# Patient Record
Sex: MALE | Employment: OTHER | URBAN - METROPOLITAN AREA
[De-identification: names, ages, dates, MRNs, and addresses within clinical notes are randomized per-mention and may not be internally consistent; named-entity substitution may affect disease eponyms.]

---

## 2021-01-20 ENCOUNTER — HOSPITAL ENCOUNTER (OUTPATIENT)
Dept: PREADMISSION TESTING | Age: 61
Discharge: HOME | End: 2021-01-20
Payer: OTHER GOVERNMENT

## 2021-01-20 LAB
BASOPHILS # BLD: 0 K/UL (ref 0–0.1)
BASOPHILS NFR BLD: 1 % (ref 0–2)
DIFFERENTIAL METHOD BLD: ABNORMAL
EOSINOPHIL # BLD: 0.1 K/UL (ref 0–0.4)
EOSINOPHIL NFR BLD: 2 % (ref 0–5)
ERYTHROCYTE [DISTWIDTH] IN BLOOD BY AUTOMATED COUNT: 13.4 % (ref 11.6–14.5)
HCT VFR BLD AUTO: 47.8 % (ref 36–48)
HGB BLD-MCNC: 16.4 G/DL (ref 13–16)
LYMPHOCYTES # BLD: 2.1 K/UL (ref 0.9–3.6)
LYMPHOCYTES NFR BLD: 33 % (ref 21–52)
MCH RBC QN AUTO: 31.7 PG (ref 24–34)
MCHC RBC AUTO-ENTMCNC: 34.3 G/DL (ref 31–37)
MCV RBC AUTO: 92.3 FL (ref 74–97)
MONOCYTES # BLD: 0.7 K/UL (ref 0.05–1.2)
MONOCYTES NFR BLD: 11 % (ref 3–10)
NEUTS SEG # BLD: 3.4 K/UL (ref 1.8–8)
NEUTS SEG NFR BLD: 53 % (ref 40–73)
PLATELET # BLD AUTO: 291 K/UL (ref 135–420)
PMV BLD AUTO: 10 FL (ref 9.2–11.8)
RBC # BLD AUTO: 5.18 M/UL (ref 4.7–5.5)
WBC # BLD AUTO: 6.4 K/UL (ref 4.6–13.2)

## 2021-01-20 PROCEDURE — 85025 COMPLETE CBC W/AUTO DIFF WBC: CPT

## 2021-01-20 PROCEDURE — 36415 COLL VENOUS BLD VENIPUNCTURE: CPT

## 2021-02-03 ENCOUNTER — HOSPITAL ENCOUNTER (OUTPATIENT)
Dept: PREADMISSION TESTING | Age: 61
Discharge: HOME | End: 2021-02-03
Payer: OTHER GOVERNMENT

## 2021-02-03 PROCEDURE — U0003 INFECTIOUS AGENT DETECTION BY NUCLEIC ACID (DNA OR RNA); SEVERE ACUTE RESPIRATORY SYNDROME CORONAVIRUS 2 (SARS-COV-2) (CORONAVIRUS DISEASE [COVID-19]), AMPLIFIED PROBE TECHNIQUE, MAKING USE OF HIGH THROUGHPUT TECHNOLOGIES AS DESCRIBED BY CMS-2020-01-R: HCPCS

## 2021-02-04 LAB — SARS-COV-2, COV2NT: NOT DETECTED

## 2021-02-07 NOTE — H&P
12/30/20      Patient Name:   Jay Justice  Account #:  [de-identified]  YOB: 1960    Chief Complaint:  Bilateral shoulder pain, right more than left. History of Chief Complaint:  He had the MRI done of his right shoulder. The study was reviewed which shows a nearly full-thickness tear involving the supraspinatus with no sign of muscle atrophy. There is some degenerative change of the [facility] joint and signs of impingement. Past Medical/Surgical History:    Disease/Disorder Date Side Surgery Date Side Comment   Arthritis         Sleep apnea         Allergies:  No known allergies. Ingredient Reaction Medication Name Comment   NO KNOWN ALLERGIES        Current Medications:    Medication Directions   Percocet 5 mg-325 mg tablet take 1 tablet by oral route every 4 hours as needed   Social History:    SMOKING  Status Tobacco Type Units Per Day Yrs Used   Never smoker      ALCOHOL  There is no history of alcohol use. Family History:    Disease Detail Family Member Age Cause of Death Comments   Family history of Cancer   N    Family history of Diabetes   N    Review of Systems:    GENERAL:  Patient has no signs of chills. , fever or weight change  HEAD/ENTM:  Patient has no signs of headaches, dizziness, hearing loss, ringing in ears, sore throat/hoarseness, recent cold, double vision, blurred vision, itchy eyes, eye redness or eye discharge. NEUROLOGIC: Patient presents with numbness/tingling. Patient has no signs of fainting, muscle weakness, loss of balance or seizure disorder. CARDIOVASCULAR:  Patient has no signs of chest pain, palpitations, rheumatic fever or heart murmur. RESPIRATORY:  Patient has no signs of chronic cough, wheezing, difficulty breathing, pain on breathing or shortness of breath. GASTROINTESTINAL:  Patient has no signs of nausea/vomiting, difficulty swallowing, gas/bloating, indigestion, abdominal pain, diarrhea, bloody stools or hemorrhoids.   GENITOURINARY:  Patient has no signs of blood in urine, painful urinating, burning sensation, bladder/kidney infection, frequent urinating or incontinence. MUSCULOSKELETAL: Patient presents with tendonitis. Patient has no signs of fracture/dislocation, sprain/strain, joint stiffness, joint pain, rheumatoid disease, gout or swelling of feet. INTEGUMENTARY:  Patient has no signs of rash/itching, psoriasis, Raynaud's phenomenon or varicose veins. EMOTIONAL:  Patient has no signs of anxiety, depression, bipolar disorder, memory loss or change in mood. Vitals:  Date BP Pulse Temp (F) Resp. (per min.) Height (Total in.) Weight (lbs.) BMI   12/16/2020     67.00  30.85   Physical Examination:  His physical exam is unchanged in that the right shoulder shows full motion. He has excellent strength in all planes. He has pain with thumb-up abduction. He also has significant pain with rotation of the forearm with the shoulder abducted 70 degrees. He has some mild pain also with Speed's and [place]'s maneuvers. He has pain also with flexion to 90 degrees followed by internal rotation. Impression:  Right shoulder impingement, degenerative joint disease of the acromioclavicular joint, nearly full-thickness rotator cuff tear. Plan:  He will be scheduled for a right shoulder arthroscopic decompression, resection of the distal clavicle and rotator cuff repair. He understands the risks and benefits of the procedure and is ready to proceed. He was given a sling; postop he will get Percocet.

## 2021-02-09 ENCOUNTER — ANESTHESIA (OUTPATIENT)
Dept: SURGERY | Age: 61
End: 2021-02-09
Payer: OTHER GOVERNMENT

## 2021-02-09 ENCOUNTER — ANESTHESIA EVENT (OUTPATIENT)
Dept: SURGERY | Age: 61
End: 2021-02-09
Payer: OTHER GOVERNMENT

## 2021-02-09 ENCOUNTER — HOSPITAL ENCOUNTER (OUTPATIENT)
Age: 61
Setting detail: OUTPATIENT SURGERY
Discharge: HOME | End: 2021-02-09
Attending: ORTHOPAEDIC SURGERY | Admitting: ORTHOPAEDIC SURGERY
Payer: OTHER GOVERNMENT

## 2021-02-09 VITALS
TEMPERATURE: 96.3 F | SYSTOLIC BLOOD PRESSURE: 115 MMHG | HEART RATE: 80 BPM | BODY MASS INDEX: 32.83 KG/M2 | WEIGHT: 209.2 LBS | RESPIRATION RATE: 16 BRPM | DIASTOLIC BLOOD PRESSURE: 53 MMHG | HEIGHT: 67 IN | OXYGEN SATURATION: 97 %

## 2021-02-09 DIAGNOSIS — M75.41 ROTATOR CUFF IMPINGEMENT SYNDROME OF RIGHT SHOULDER: Primary | Chronic | ICD-10-CM

## 2021-02-09 PROCEDURE — 77030002960 HC SUT PASS S&N -C: Performed by: ORTHOPAEDIC SURGERY

## 2021-02-09 PROCEDURE — 74011250636 HC RX REV CODE- 250/636: Performed by: NURSE ANESTHETIST, CERTIFIED REGISTERED

## 2021-02-09 PROCEDURE — 93005 ELECTROCARDIOGRAM TRACING: CPT

## 2021-02-09 PROCEDURE — 76210000021 HC REC RM PH II 0.5 TO 1 HR: Performed by: ORTHOPAEDIC SURGERY

## 2021-02-09 PROCEDURE — 77030034478 HC TU IRR ARTHRO PT ARTH -B: Performed by: ORTHOPAEDIC SURGERY

## 2021-02-09 PROCEDURE — 64415 NJX AA&/STRD BRCH PLXS IMG: CPT | Performed by: ANESTHESIOLOGY

## 2021-02-09 PROCEDURE — 77030020782 HC GWN BAIR PAWS FLX 3M -B: Performed by: ORTHOPAEDIC SURGERY

## 2021-02-09 PROCEDURE — 74011000250 HC RX REV CODE- 250: Performed by: ANESTHESIOLOGY

## 2021-02-09 PROCEDURE — 77030006884 HC BLD SHV INCIS S&N -B: Performed by: ORTHOPAEDIC SURGERY

## 2021-02-09 PROCEDURE — 77030012711 HC WND ARTHRO ABLT S&N -D: Performed by: ORTHOPAEDIC SURGERY

## 2021-02-09 PROCEDURE — 77030040361 HC SLV COMPR DVT MDII -B: Performed by: ORTHOPAEDIC SURGERY

## 2021-02-09 PROCEDURE — 74011250636 HC RX REV CODE- 250/636: Performed by: ORTHOPAEDIC SURGERY

## 2021-02-09 PROCEDURE — 77030004451 HC BUR SHV S&N -B: Performed by: ORTHOPAEDIC SURGERY

## 2021-02-09 PROCEDURE — 2709999900 HC NON-CHARGEABLE SUPPLY: Performed by: ORTHOPAEDIC SURGERY

## 2021-02-09 PROCEDURE — C1713 ANCHOR/SCREW BN/BN,TIS/BN: HCPCS | Performed by: ORTHOPAEDIC SURGERY

## 2021-02-09 PROCEDURE — 74011250636 HC RX REV CODE- 250/636: Performed by: ANESTHESIOLOGY

## 2021-02-09 PROCEDURE — 74011000250 HC RX REV CODE- 250: Performed by: ORTHOPAEDIC SURGERY

## 2021-02-09 PROCEDURE — 76060000035 HC ANESTHESIA 2 TO 2.5 HR: Performed by: ORTHOPAEDIC SURGERY

## 2021-02-09 PROCEDURE — 76210000006 HC OR PH I REC 0.5 TO 1 HR: Performed by: ORTHOPAEDIC SURGERY

## 2021-02-09 PROCEDURE — 77030008477 HC STYL SATN SLP COVD -A: Performed by: ANESTHESIOLOGY

## 2021-02-09 PROCEDURE — 74011000250 HC RX REV CODE- 250: Performed by: NURSE ANESTHETIST, CERTIFIED REGISTERED

## 2021-02-09 PROCEDURE — 77030008683 HC TU ET CUF COVD -A: Performed by: ANESTHESIOLOGY

## 2021-02-09 PROCEDURE — 77030002916 HC SUT ETHLN J&J -A: Performed by: ORTHOPAEDIC SURGERY

## 2021-02-09 PROCEDURE — 76010000131 HC OR TIME 2 TO 2.5 HR: Performed by: ORTHOPAEDIC SURGERY

## 2021-02-09 PROCEDURE — 76942 ECHO GUIDE FOR BIOPSY: CPT | Performed by: ORTHOPAEDIC SURGERY

## 2021-02-09 PROCEDURE — 77030006643: Performed by: ANESTHESIOLOGY

## 2021-02-09 DEVICE — MULTIFIX S-ULTRA 5.5MM KNOTLESS ANCHOR
Type: IMPLANTABLE DEVICE | Site: SHOULDER | Status: FUNCTIONAL
Brand: MULTIFIX

## 2021-02-09 RX ORDER — BUPIVACAINE HYDROCHLORIDE AND EPINEPHRINE 5; 5 MG/ML; UG/ML
INJECTION, SOLUTION EPIDURAL; INTRACAUDAL; PERINEURAL AS NEEDED
Status: DISCONTINUED | OUTPATIENT
Start: 2021-02-09 | End: 2021-02-09 | Stop reason: HOSPADM

## 2021-02-09 RX ORDER — ROCURONIUM BROMIDE 10 MG/ML
INJECTION, SOLUTION INTRAVENOUS AS NEEDED
Status: DISCONTINUED | OUTPATIENT
Start: 2021-02-09 | End: 2021-02-09 | Stop reason: HOSPADM

## 2021-02-09 RX ORDER — CEFAZOLIN SODIUM/WATER 2 G/20 ML
2 SYRINGE (ML) INTRAVENOUS ONCE
Status: COMPLETED | OUTPATIENT
Start: 2021-02-09 | End: 2021-02-09

## 2021-02-09 RX ORDER — DEXAMETHASONE SODIUM PHOSPHATE 4 MG/ML
INJECTION, SOLUTION INTRA-ARTICULAR; INTRALESIONAL; INTRAMUSCULAR; INTRAVENOUS; SOFT TISSUE
Status: COMPLETED | OUTPATIENT
Start: 2021-02-09 | End: 2021-02-09

## 2021-02-09 RX ORDER — FLUMAZENIL 0.1 MG/ML
0.2 INJECTION INTRAVENOUS
Status: DISCONTINUED | OUTPATIENT
Start: 2021-02-09 | End: 2021-02-09 | Stop reason: HOSPADM

## 2021-02-09 RX ORDER — ONDANSETRON 2 MG/ML
4 INJECTION INTRAMUSCULAR; INTRAVENOUS ONCE
Status: DISCONTINUED | OUTPATIENT
Start: 2021-02-09 | End: 2021-02-09 | Stop reason: HOSPADM

## 2021-02-09 RX ORDER — DIPHENHYDRAMINE HYDROCHLORIDE 50 MG/ML
12.5 INJECTION, SOLUTION INTRAMUSCULAR; INTRAVENOUS
Status: DISCONTINUED | OUTPATIENT
Start: 2021-02-09 | End: 2021-02-09 | Stop reason: HOSPADM

## 2021-02-09 RX ORDER — PROPOFOL 10 MG/ML
INJECTION, EMULSION INTRAVENOUS AS NEEDED
Status: DISCONTINUED | OUTPATIENT
Start: 2021-02-09 | End: 2021-02-09 | Stop reason: HOSPADM

## 2021-02-09 RX ORDER — HYDROMORPHONE HYDROCHLORIDE 1 MG/ML
0.2 INJECTION, SOLUTION INTRAMUSCULAR; INTRAVENOUS; SUBCUTANEOUS AS NEEDED
Status: DISCONTINUED | OUTPATIENT
Start: 2021-02-09 | End: 2021-02-09 | Stop reason: HOSPADM

## 2021-02-09 RX ORDER — MIDAZOLAM HYDROCHLORIDE 1 MG/ML
INJECTION, SOLUTION INTRAMUSCULAR; INTRAVENOUS AS NEEDED
Status: DISCONTINUED | OUTPATIENT
Start: 2021-02-09 | End: 2021-02-09 | Stop reason: HOSPADM

## 2021-02-09 RX ORDER — LIDOCAINE HYDROCHLORIDE 20 MG/ML
INJECTION, SOLUTION EPIDURAL; INFILTRATION; INTRACAUDAL; PERINEURAL AS NEEDED
Status: DISCONTINUED | OUTPATIENT
Start: 2021-02-09 | End: 2021-02-09 | Stop reason: HOSPADM

## 2021-02-09 RX ORDER — ROPIVACAINE HYDROCHLORIDE 5 MG/ML
INJECTION, SOLUTION EPIDURAL; INFILTRATION; PERINEURAL
Status: COMPLETED | OUTPATIENT
Start: 2021-02-09 | End: 2021-02-09

## 2021-02-09 RX ORDER — ALBUTEROL SULFATE 0.83 MG/ML
2.5 SOLUTION RESPIRATORY (INHALATION)
Status: DISCONTINUED | OUTPATIENT
Start: 2021-02-09 | End: 2021-02-09 | Stop reason: HOSPADM

## 2021-02-09 RX ORDER — ONDANSETRON 2 MG/ML
INJECTION INTRAMUSCULAR; INTRAVENOUS AS NEEDED
Status: DISCONTINUED | OUTPATIENT
Start: 2021-02-09 | End: 2021-02-09 | Stop reason: HOSPADM

## 2021-02-09 RX ORDER — HYDROCODONE BITARTRATE AND ACETAMINOPHEN 5; 325 MG/1; MG/1
1 TABLET ORAL AS NEEDED
Status: DISCONTINUED | OUTPATIENT
Start: 2021-02-09 | End: 2021-02-09 | Stop reason: HOSPADM

## 2021-02-09 RX ORDER — DEXMEDETOMIDINE HYDROCHLORIDE 100 UG/ML
INJECTION, SOLUTION INTRAVENOUS
Status: COMPLETED | OUTPATIENT
Start: 2021-02-09 | End: 2021-02-09

## 2021-02-09 RX ORDER — HYDROMORPHONE HYDROCHLORIDE 1 MG/ML
0.5 INJECTION, SOLUTION INTRAMUSCULAR; INTRAVENOUS; SUBCUTANEOUS
Status: DISCONTINUED | OUTPATIENT
Start: 2021-02-09 | End: 2021-02-09 | Stop reason: HOSPADM

## 2021-02-09 RX ORDER — SODIUM CHLORIDE, SODIUM LACTATE, POTASSIUM CHLORIDE, CALCIUM CHLORIDE 600; 310; 30; 20 MG/100ML; MG/100ML; MG/100ML; MG/100ML
125 INJECTION, SOLUTION INTRAVENOUS CONTINUOUS
Status: DISCONTINUED | OUTPATIENT
Start: 2021-02-09 | End: 2021-02-09 | Stop reason: HOSPADM

## 2021-02-09 RX ORDER — SODIUM CHLORIDE, SODIUM LACTATE, POTASSIUM CHLORIDE, CALCIUM CHLORIDE 600; 310; 30; 20 MG/100ML; MG/100ML; MG/100ML; MG/100ML
25 INJECTION, SOLUTION INTRAVENOUS CONTINUOUS
Status: DISCONTINUED | OUTPATIENT
Start: 2021-02-09 | End: 2021-02-09 | Stop reason: HOSPADM

## 2021-02-09 RX ORDER — GLYCOPYRROLATE 0.2 MG/ML
INJECTION INTRAMUSCULAR; INTRAVENOUS AS NEEDED
Status: DISCONTINUED | OUTPATIENT
Start: 2021-02-09 | End: 2021-02-09 | Stop reason: HOSPADM

## 2021-02-09 RX ORDER — FENTANYL CITRATE 50 UG/ML
INJECTION, SOLUTION INTRAMUSCULAR; INTRAVENOUS AS NEEDED
Status: DISCONTINUED | OUTPATIENT
Start: 2021-02-09 | End: 2021-02-09 | Stop reason: HOSPADM

## 2021-02-09 RX ORDER — NEOSTIGMINE METHYLSULFATE 1 MG/ML
INJECTION, SOLUTION INTRAVENOUS AS NEEDED
Status: DISCONTINUED | OUTPATIENT
Start: 2021-02-09 | End: 2021-02-09 | Stop reason: HOSPADM

## 2021-02-09 RX ORDER — EPHEDRINE SULFATE/0.9% NACL/PF 50 MG/5 ML
SYRINGE (ML) INTRAVENOUS AS NEEDED
Status: DISCONTINUED | OUTPATIENT
Start: 2021-02-09 | End: 2021-02-09 | Stop reason: HOSPADM

## 2021-02-09 RX ORDER — OXYCODONE AND ACETAMINOPHEN 5; 325 MG/1; MG/1
1 TABLET ORAL
Qty: 60 TAB | Refills: 0 | Status: SHIPPED
Start: 2021-02-09 | End: 2021-02-16

## 2021-02-09 RX ADMIN — Medication 10 MG: at 11:08

## 2021-02-09 RX ADMIN — Medication 2 G: at 09:32

## 2021-02-09 RX ADMIN — ROCURONIUM BROMIDE 40 MG: 10 INJECTION, SOLUTION INTRAVENOUS at 09:40

## 2021-02-09 RX ADMIN — Medication 3 MG: at 11:23

## 2021-02-09 RX ADMIN — GLYCOPYRROLATE 0.4 MG: 0.2 INJECTION INTRAMUSCULAR; INTRAVENOUS at 11:23

## 2021-02-09 RX ADMIN — Medication 10 MG: at 09:58

## 2021-02-09 RX ADMIN — SODIUM CHLORIDE, SODIUM LACTATE, POTASSIUM CHLORIDE, AND CALCIUM CHLORIDE 125 ML/HR: 600; 310; 30; 20 INJECTION, SOLUTION INTRAVENOUS at 07:31

## 2021-02-09 RX ADMIN — DEXAMETHASONE SODIUM PHOSPHATE 4 MG: 4 INJECTION, SOLUTION INTRAMUSCULAR; INTRAVENOUS at 08:39

## 2021-02-09 RX ADMIN — Medication 10 MG: at 10:13

## 2021-02-09 RX ADMIN — MIDAZOLAM 2 MG: 1 INJECTION INTRAMUSCULAR; INTRAVENOUS at 08:27

## 2021-02-09 RX ADMIN — Medication 10 MG: at 10:23

## 2021-02-09 RX ADMIN — ONDANSETRON HYDROCHLORIDE 4 MG: 2 INJECTION INTRAMUSCULAR; INTRAVENOUS at 09:35

## 2021-02-09 RX ADMIN — PROPOFOL 180 MG: 10 INJECTION, EMULSION INTRAVENOUS at 09:39

## 2021-02-09 RX ADMIN — FENTANYL CITRATE 50 MCG: 50 INJECTION, SOLUTION INTRAMUSCULAR; INTRAVENOUS at 08:29

## 2021-02-09 RX ADMIN — SODIUM CHLORIDE, SODIUM LACTATE, POTASSIUM CHLORIDE, AND CALCIUM CHLORIDE: 600; 310; 30; 20 INJECTION, SOLUTION INTRAVENOUS at 10:14

## 2021-02-09 RX ADMIN — Medication 10 MG: at 10:43

## 2021-02-09 RX ADMIN — SODIUM CHLORIDE, SODIUM LACTATE, POTASSIUM CHLORIDE, AND CALCIUM CHLORIDE 125 ML/HR: 600; 310; 30; 20 INJECTION, SOLUTION INTRAVENOUS at 12:05

## 2021-02-09 RX ADMIN — GLYCOPYRROLATE 0.4 MG: 0.2 INJECTION INTRAMUSCULAR; INTRAVENOUS at 09:35

## 2021-02-09 RX ADMIN — LIDOCAINE HYDROCHLORIDE 80 MG: 20 INJECTION, SOLUTION EPIDURAL; INFILTRATION; INTRACAUDAL; PERINEURAL at 09:38

## 2021-02-09 RX ADMIN — DEXMEDETOMIDINE HYDROCHLORIDE 20 MCG: 100 INJECTION, SOLUTION INTRAVENOUS at 08:39

## 2021-02-09 RX ADMIN — ROPIVACAINE HYDROCHLORIDE 20 ML: 5 INJECTION, SOLUTION EPIDURAL; INFILTRATION; PERINEURAL at 08:39

## 2021-02-09 RX ADMIN — FENTANYL CITRATE 50 MCG: 50 INJECTION, SOLUTION INTRAMUSCULAR; INTRAVENOUS at 08:27

## 2021-02-09 NOTE — ANESTHESIA POSTPROCEDURE EVALUATION
Post-Anesthesia Evaluation & Assessment    Visit Vitals  /70   Pulse 76   Temp 36.2 °C (97.2 °F)   Resp 14   Ht 5' 7\" (1.702 m)   Wt 94.9 kg (209 lb 3.2 oz)   SpO2 96%   BMI 32.77 kg/m²       Nausea/Vomiting: no nausea and no vomiting    Post-operative hydration adequate. Pain score (VAS): 0    Mental status & Level of consciousness: alert and oriented x 3    Neurological status: moves all extremities, sensation grossly intact    Pulmonary status: airway patent, no supplemental oxygen required    Complications related to anesthesia: none    Patient has met all discharge requirements. Additional comments:  Procedure(s):  RIGHT SHOULDER ARTHROSCOPIC DECOMPRESSION, RESECTION DISTAL CLAVICLE ROTATOR CUFF REPAIR GEN WITH SCALENE BLOCK PRN. general    <BSHSIANPOST>    INITIAL Post-op Vital signs:   Vitals Value Taken Time   /91 02/09/21 1222   Temp 36.2 °C (97.2 °F) 02/09/21 1215   Pulse 76 02/09/21 1225   Resp 14 02/09/21 1225   SpO2 96 % 02/09/21 1225   Vitals shown include unvalidated device data.

## 2021-02-09 NOTE — PERIOP NOTES
Report given to Valentin Triana RN for phase 2 level of care.     Visit Vitals  /70   Pulse 76   Temp 97.2 °F (36.2 °C)   Resp 14   Ht 5' 7\" (1.702 m)   Wt 94.9 kg (209 lb 3.2 oz)   SpO2 96%   BMI 32.77 kg/m²       Intake/Output Summary (Last 24 hours) at 2/9/2021 1233  Last data filed at 2/9/2021 1230  Gross per 24 hour   Intake 2300 ml   Output 350 ml   Net 1950 ml

## 2021-02-09 NOTE — DISCHARGE INSTRUCTIONS
Patient Education        9 Things To Do If You've Been Exposed to COVID-19    Stay home. If you've been exposed, you should stay in quarantine for at least 14 days. Ask your doctor when it's safe to end your quarantine. Don't go to school, work, or public areas. And don't use public transportation, ride-shares, or taxis unless you have no choice. Leave your home only if you need to get medical care. But call the doctor's office first so they know you're coming, and wear a cloth face cover when you go. Call your doctor. Call your doctor or other health professional to let them know that you've been exposed. They might want you to be tested, or they may have other instructions for you. If you become sick, wear a face cover when you are around other people. It can help stop the spread of the virus when you cough or sneeze. Limit contact with people in your home. If possible, stay in a separate bedroom and use a separate bathroom. Avoid contact with pets and other animals. Cover your mouth and nose with a tissue when you cough or sneeze. Then throw it in the trash right away. Wash your hands often, especially after you cough or sneeze. Use soap and water, and scrub for at least 20 seconds. If soap and water aren't available, use an alcohol-based hand . Don't share personal household items. These include bedding, towels, cups and glasses, and eating utensils. Clean and disinfect your home every day. Use household  or disinfectant wipes or sprays. Take special care to clean things that you grab with your hands. These include doorknobs, remote controls, phones, and handles on your refrigerator and microwave. And don't forget countertops, tabletops, bathrooms, and computer keyboards. Current as of: December 18, 2020               Content Version: 12.7  © 2006-2021 [employer].    Care instructions adapted under license by [employer] (which disclaims liability or warranty for this information). If you have questions about a medical condition or this instruction, always ask your healthcare professional. [place] [address] any warranty or liability for your use of this information. DISCHARGE SUMMARY from Nurse    PATIENT INSTRUCTIONS:    After general anesthesia or intravenous sedation, for 24 hours or while taking prescription Narcotics:  · Limit your activities  · Do not drive and operate hazardous machinery  · Do not make important personal or business decisions  · Do  not drink alcoholic beverages  · If you have not urinated within 8 hours after discharge, please contact your surgeon on call. Report the following to your surgeon:  · Excessive pain, swelling, redness or odor of or around the surgical area  · Temperature over 100.5  · Nausea and vomiting lasting longer than 4 hours or if unable to take medications  · Any signs of decreased circulation or nerve impairment to extremity: change in color, persistent  numbness, tingling, coldness or increase pain  · Any questions    What to do at Home:  [address] FROM DR Lord Wells  RETURN TO OFFICE IN 1 WEEK CALL FOR APPT    If you experience any of the following symptoms heavy bleeding, fevers, severe pain, circulation changes, please follow up with dr True Cooley    *  Please give a list of your current medications to your Primary Care Provider. *  Please update this list whenever your medications are discontinued, doses are      changed, or new medications (including over-the-counter products) are added. *  Please carry medication information at all times in case of emergency situations. These are general instructions for a healthy lifestyle:    No smoking/ No tobacco products/ Avoid exposure to second hand smoke  Surgeon General's Warning:  Quitting smoking now greatly reduces serious risk to your health.     Obesity, smoking, and sedentary lifestyle greatly increases your risk for illness    A healthy diet, regular physical exercise & weight monitoring are important for maintaining a healthy lifestyle    You may be retaining fluid if you have a history of heart failure or if you experience any of the following symptoms:  Weight gain of 3 pounds or more overnight or 5 pounds in a week, increased swelling in our hands or feet or shortness of breath while lying flat in bed. Please call your doctor as soon as you notice any of these symptoms; do not wait until your next office visit. The discharge information has been reviewed with the patient and caregiver. The patient and caregiver verbalized understanding. Discharge medications reviewed with the patient and caregiver and appropriate educational materials and side effects teaching were provided.   ___________________________________________________________________________________________________________________________________    Patient armband removed and shredded

## 2021-02-09 NOTE — INTERVAL H&P NOTE
Update History & Physical 
 
The Patient's History and Physical of February 7, 2021 was reviewed with the patient and I examined the patient. There was no change. The surgical site was confirmed by the patient and me. Plan:  The risk, benefits, expected outcome, and alternative to the recommended procedure have been discussed with the patient. Patient understands and wants to proceed with the procedure.  
 
Electronically signed by Shantanu Oshea MD on 2/9/2021 at 9:20 AM

## 2021-02-09 NOTE — OP NOTES
PREOPERATIVE DIAGNOSES:    1. Rotator cuff tear, right shoulder  2. Impingement. 3. Degenerative arthritis of the acromioclavicular joint. POSTOPERATIVE DIAGNOSES:    1. Rotator cuff tear, right shoulder  2. Impingement. 3. Degenerative arthritis of the acromioclavicular joint. 4. Biceps partial tear     PROCEDURES PERFORMED:    1. Arthroscopic decompression. 2. Resection distal clavicle. 3. Single Row Rotator cuff repair, right shoulder. 4. Biceps release    SURGEON:  Burak Machado. Breezy Shea MD    ANESTHESIOLOGIST:  Anesthesiologist: Glynn May MD  CRNA: Heidy Alvarez CRNA    ASSISTANTS: Circ-1: Eleanor Chávez RN  Scrub Tech-1: Angela Shah  Scrub Tech-2: Deisy Santoyo  Scrub Private/Assistant: Mis Neil CNA    ANESTHESIA:   General anesthesia after scalene block. COMPLICATIONS:  None. BLOOD LOSS:  Minimal.      SPECIMENS: None    DESCRIPTION OF PROCEDURE:  This 63y.o.-year-old male, with a history of persistent pain in the right shoulder, unresponsive to conservative care. The patient had a MRI showing the above noted findings and was then scheduled for surgery. PROCEDURE:  The patient was taken to the operating room and given general anesthesia after a scalene block. When it was adequate, the rightshoulder was examined and showed full motion with no gross instability. The patient was placed in a left lateral decubitus position. The right shoulder was placed in traction, prepped and draped in a normal manner and injected with 30 mL of 1% Marcaine with Epinephrine. Anterior and posterior stab wounds were made, and a standard arthroscopic examination was performed. This revealed a tear of the supraspinatus and the undersurface of the tear was debrided until all of the nonviable tissue was removed.  The biceps showed extensive partial tearing and the articular surfaces of the joint appeared normal.  The electrocautery wand was used to release the intra-articular portion of the biceps and the remaining superior labrum was smoothed. The instruments were then redirected in the subacromial space. A lateral portal was established and a limited bursectomy was carried out. The full thickness nature of the rotator cuff tear was confirmed. The cuff was mobilized and could be brought back down into anatomic position without difficulty. The soft tissue was removed from the anterior acromion and distal clavicle, including the coracoacromial ligament. There was a sharp hooked appearance on the anterior acromion, and severe arthritic changes of the [facility] joint with complete loss of joint cartilage, and large inferior osteophytes. A high-speed bur was used to perform an acromioplasty. The same level of resection was carried across the distal clavicle. The arthroscope was switched to the lateral portal to assure that adequate bone removal had been achieved, and the result was a flat acromion. The [facility] joint was approached directly through the anterior portal.  The most medial few millimeters of the acromion and the distal centimeter of the clavicle were removed arthroscopically. Bleeding points were treated with the electrocautery wand for hemostasis. The original footprint was cleaned of soft tissue and a high-speed bur was used to create a bleeding surface. A single row Ultra-Tape technique was used for the repair. Two Ultra-Tape sutures were passed through the edge of the cuff in a vertical mattress technique. They were then secured at the footprint with two Multifix S 5.5mm PEEK anchors resulting in a solid repair. The instruments were removed. The wounds were closed using 3-0 nylon suture. A sterile compressive dressing was applied, along with a sling. The patient left the operating room in satisfactory condition.

## 2021-02-09 NOTE — PERIOP NOTES
Reviewed PTA medication list with patient/caregiver and patient/caregiver denies any additional medications. Patient admits to having a responsible adult care for them at home for at least 24 hours after surgery. Patient encouraged to use gown warming system and informed that using said warming gown to regulate body temperature prior to a procedure has been shown to help reduce the risks of blood clots and infection. Patient's pharmacy of choice verified and documented in PTA medication section. Dual skin assessment & fall risk band verification completed with Bailey Ramirez RN.

## 2021-02-09 NOTE — ANESTHESIA PREPROCEDURE EVALUATION
Relevant Problems   No relevant active problems       Anesthetic History   No history of anesthetic complications            Review of Systems / Medical History  Patient summary reviewed, nursing notes reviewed and pertinent labs reviewed    Pulmonary        Sleep apnea: CPAP      Pertinent negatives: No smoker     Neuro/Psych              Cardiovascular                Pertinent negatives: No hypertension  Exercise tolerance: >4 METS     GI/Hepatic/Renal  Within defined limits           Pertinent negatives: No GERD   Endo/Other        Obesity     Other Findings              Physical Exam    Airway  Mallampati: III  TM Distance: 4 - 6 cm  Neck ROM: decreased range of motion        Cardiovascular    Rhythm: regular  Rate: normal         Dental    Dentition: Caps/crowns     Pulmonary  Breath sounds clear to auscultation               Abdominal  GI exam deferred       Other Findings            Anesthetic Plan    ASA: 3  Anesthesia type: general      Post-op pain plan if not by surgeon: peripheral nerve block single    Induction: Intravenous  Anesthetic plan and risks discussed with: Patient

## 2021-02-09 NOTE — PERIOP NOTES
Paged Dr. Giacomo Hernandez for patient sign out. Patient meets criteria for transfer to the next phase of care.

## 2021-02-09 NOTE — ANESTHESIA PROCEDURE NOTES
Peripheral Block    Start time: 2/9/2021 8:27 AM  End time: 2/9/2021 8:37 AM  Performed by: Michaela Nissen, MD  Authorized by: Michaela Nissen, MD       Pre-procedure: Indications: at surgeon's request and post-op pain management    Preanesthetic Checklist: patient identified, risks and benefits discussed, site marked, timeout performed, anesthesia consent given and patient being monitored      Block Type:   Block Type:   Intercostal  Laterality:  Right  Monitoring:  Continuous pulse ox, frequent vital sign checks, heart rate, responsive to questions and oxygen  Injection Technique:  Single shot  Procedures: ultrasound guided and nerve stimulator    Patient Position: seated  Prep: chlorhexidine    Location:  Interscalene  Needle Type:  Stimuplex  Needle Gauge:  20 G  Needle Localization:  Nerve stimulator and ultrasound guidance  Motor Response comment:   Motor Response: minimal motor response >0.4 mA   Medication Injected:  Ropivacaine (PF) (NAROPIN)(0.5%) 5 mg/mL injection, 20 mL  dexamethasone (DECADRON) 4 mg/mL injection, 4 mg  dexmedeTOMidine (PRECEDEX) 100 mcg/mL iv solution, 20 mcg    Assessment:  Number of attempts:  2  Injection Assessment:  Incremental injection every 5 mL, negative aspiration for CSF, no paresthesia, ultrasound image on chart, local visualized surrounding nerve on ultrasound, negative aspiration for blood and no intravascular symptoms  Patient tolerance:  Patient tolerated the procedure well with no immediate complications  Had to reposition needle due to poor visualization intially

## 2021-02-10 LAB
ATRIAL RATE: 59 BPM
CALCULATED P AXIS, ECG09: 72 DEGREES
CALCULATED R AXIS, ECG10: 63 DEGREES
CALCULATED T AXIS, ECG11: 55 DEGREES
DIAGNOSIS, 93000: NORMAL
P-R INTERVAL, ECG05: 168 MS
Q-T INTERVAL, ECG07: 392 MS
QRS DURATION, ECG06: 86 MS
QTC CALCULATION (BEZET), ECG08: 388 MS
VENTRICULAR RATE, ECG03: 59 BPM

## (undated) DEVICE — TUBE IRRIG L8IN LNG PT W/ CONN FOR PMP SYS REDEUCE

## (undated) DEVICE — SUT ETHLN 3-0 18IN PS2 BLK --

## (undated) DEVICE — STERILE POLYISOPRENE POWDER-FREE SURGICAL GLOVES: Brand: PROTEXIS

## (undated) DEVICE — PACK PROCEDURE SURG SHLDR ORTHOPEDIC CUST

## (undated) DEVICE — GARMENT,MEDLINE,DVT,INT,CALF,MED, GEN2: Brand: MEDLINE

## (undated) DEVICE — SOLUTION SURG PREP 26 CC PURPREP

## (undated) DEVICE — STERILE POLYISOPRENE POWDER-FREE SURGICAL GLOVES WITH EMOLLIENT COATING: Brand: PROTEXIS

## (undated) DEVICE — SOLUTION IRRIG 3000ML LAC R FLX CONT

## (undated) DEVICE — 4.5 MM INCISOR PLUS STRAIGHT                                    BLADES, POWER/EP-1, VIOLET, PACKAGED                                    6 PER BOX, STERILE

## (undated) DEVICE — SUPER TURBOVAC 90 INTEGRATED CABLE WAND ICW: Brand: COBLATION

## (undated) DEVICE — 5.5 MM ACROMIONIZER STRAIGHT                                    BURRS, POWER/EP-1, BROWN, 8000                                    MAXIMUM RPM, PACKAGED 6 PER BOX, STERILE

## (undated) DEVICE — FIRSTPASS ST SUTURE PASSER, SELF CAPTURE: Brand: FIRSTPASS

## (undated) DEVICE — ULTRATAPE 2MM BLUE 38 PKG OF 6

## (undated) DEVICE — 3M™ STERI-DRAPE™ INCISE DRAPE 1050 (60CM X 45CM): Brand: STERI-DRAPE™

## (undated) DEVICE — SHOULDER SUSPENSION KIT 6 PER BOX

## (undated) DEVICE — CANISTER SUCTION HI FLO 30000CC FLUID MGMT SYS TRUCLEAR DISP